# Patient Record
Sex: FEMALE | Race: WHITE | NOT HISPANIC OR LATINO | Employment: STUDENT | URBAN - METROPOLITAN AREA
[De-identification: names, ages, dates, MRNs, and addresses within clinical notes are randomized per-mention and may not be internally consistent; named-entity substitution may affect disease eponyms.]

---

## 2024-02-22 ENCOUNTER — HOSPITAL ENCOUNTER (EMERGENCY)
Facility: HOSPITAL | Age: 24
Discharge: HOME | End: 2024-02-22
Attending: EMERGENCY MEDICINE

## 2024-02-22 VITALS
HEART RATE: 98 BPM | BODY MASS INDEX: 27.31 KG/M2 | WEIGHT: 160 LBS | TEMPERATURE: 98 F | RESPIRATION RATE: 18 BRPM | SYSTOLIC BLOOD PRESSURE: 142 MMHG | DIASTOLIC BLOOD PRESSURE: 83 MMHG | HEIGHT: 64 IN | OXYGEN SATURATION: 100 %

## 2024-02-22 DIAGNOSIS — M79.7 FIBROMYALGIA: Primary | ICD-10-CM

## 2024-02-22 DIAGNOSIS — R20.2 PARESTHESIAS: ICD-10-CM

## 2024-02-22 LAB
APPEARANCE UR: CLEAR
B-HCG SERPL QL: NEGATIVE
BACTERIA #/AREA URNS AUTO: ABNORMAL /HPF
BILIRUB UR QL STRIP.AUTO: NEGATIVE
COLOR UR AUTO: ABNORMAL
GLUCOSE UR QL STRIP.AUTO: NORMAL
KETONES UR QL STRIP.AUTO: NEGATIVE
LEUKOCYTE ESTERASE UR QL STRIP.AUTO: 75
MUCOUS THREADS URNS QL MICRO: ABNORMAL /LPF
NITRITE UR QL STRIP.AUTO: NEGATIVE
PH UR STRIP.AUTO: 7.5 [PH]
PROT UR QL STRIP.AUTO: NEGATIVE
RBC #/AREA URNS AUTO: ABNORMAL /HPF
RBC UR QL AUTO: ABNORMAL
SP GR UR STRIP.AUTO: 1.02 (ref 1–1.03)
SQUAMOUS #/AREA URNS LPF: ABNORMAL /HPF
UROBILINOGEN UR STRIP-ACNC: NORMAL
WBC #/AREA URNS AUTO: ABNORMAL /HPF

## 2024-02-22 PROCEDURE — 99284 EMERGENCY DEPT VISIT MOD MDM: CPT | Mod: 25

## 2024-02-22 PROCEDURE — 81001 URINALYSIS AUTO W/SCOPE: CPT | Performed by: PHYSICIAN ASSISTANT

## 2024-02-22 PROCEDURE — 81025 URINE PREGNANCY TEST: CPT | Performed by: PHYSICIAN ASSISTANT

## 2024-02-22 RX ORDER — GABAPENTIN 100 MG/1
100 CAPSULE ORAL 2 TIMES DAILY
Qty: 60 CAPSULE | Refills: 0 | Status: SHIPPED | OUTPATIENT
Start: 2024-02-22 | End: 2024-03-23

## 2024-02-22 NOTE — FIRST PROVIDER EVALUATION
"Medical screening examination initiated.  I have conducted a focused provider triage encounter, findings are as follows:    Chief Complaint   Patient presents with    Tingling     Pt reports "pins and needles" in hands and feet all day and numbness in arms when waking up from sleeping x 2 weeks. Hx anxiety.      Brief history of present illness:  23 y.o. female with a history of fibromyalgia presents to the ED with "pins and needle sensation" in hands and feet for the last 2 weeks. States she woke up this morning with BUE numbness which did not go away for about 2 hours. Denies chest pain, SOB, abdominal pain, n/v, fever, chills     Vitals:    02/22/24 1006   BP: (!) 142/83   Pulse: 98   Resp: 18   Temp: 98.3 °F (36.8 °C)   SpO2: 100%   Weight: 72.6 kg (160 lb)   Height: 5' 4" (1.626 m)       Pertinent physical exam:  Awake, alert, ambulatory, non-labored respirations    Brief workup plan:  UA    Preliminary workup initiated; this workup will be continued and followed by the physician or advanced practice provider that is assigned to the patient when roomed.  "

## 2024-02-22 NOTE — Clinical Note
"Candy Llanes" Alan was seen and treated in our emergency department on 2/22/2024.  She may return to work on 02/26/2024.       If you have any questions or concerns, please don't hesitate to call.       RN    "

## 2024-02-22 NOTE — ED PROVIDER NOTES
"Encounter Date: 2/22/2024       History     Chief Complaint   Patient presents with    Tingling     Pt reports "pins and needles" in hands and feet all day and numbness in arms when waking up from sleeping x 2 weeks. Hx anxiety. Denies taking meds.     23 y.o. female with a history of fibromyalgia presents to the ED with "pins and needle sensation" in hands and feet for the last 2 weeks. States she woke up this morning with BUE numbness which did not go away for about 1 hour. Denies chest pain, SOB, abdominal pain, n/v, fever, chills. No known injury or trauma. History of anxiety however states she is not on any medication     The history is provided by the patient. No  was used.     Review of patient's allergies indicates:   Allergen Reactions    Iodine     Pcn [penicillins]     Sulfa (sulfonamide antibiotics)      Past Medical History:   Diagnosis Date    Anxiety disorder, unspecified      History reviewed. No pertinent surgical history.  History reviewed. No pertinent family history.  Social History     Tobacco Use    Smoking status: Never    Smokeless tobacco: Never     Review of Systems   Constitutional:  Negative for chills and fever.   Eyes:  Negative for visual disturbance.   Respiratory:  Negative for cough and shortness of breath.    Cardiovascular:  Negative for chest pain.   Gastrointestinal:  Negative for abdominal pain, nausea and vomiting.   Genitourinary:  Negative for dysuria.   Musculoskeletal:  Negative for arthralgias.   Skin:  Negative for color change and rash.   Neurological:  Positive for numbness. Negative for dizziness, weakness and headaches.   Psychiatric/Behavioral:  Negative for behavioral problems.    All other systems reviewed and are negative.      Physical Exam     Initial Vitals [02/22/24 1006]   BP Pulse Resp Temp SpO2   (!) 142/83 98 18 98.3 °F (36.8 °C) 100 %      MAP       --         Physical Exam    Nursing note and vitals reviewed.  Constitutional: She " appears well-developed and well-nourished.   HENT:   Head: Normocephalic and atraumatic.   Eyes: EOM are normal. Pupils are equal, round, and reactive to light.   Neck: Trachea normal and phonation normal. Neck supple.    Full passive range of motion without pain.     Cardiovascular:  Normal rate, regular rhythm and normal heart sounds.           Pulmonary/Chest: Breath sounds normal.   Musculoskeletal:         General: Normal range of motion.      Cervical back: Full passive range of motion without pain and neck supple. No spinous process tenderness or muscular tenderness.     Neurological: She is alert and oriented to person, place, and time. She has normal strength. No sensory deficit. She displays a negative Romberg sign. Coordination and gait normal. GCS score is 15. GCS eye subscore is 4. GCS verbal subscore is 5. GCS motor subscore is 6.   5/5 strength to BUE and BLE, distal sensation in tact with good cap refill    Skin: Skin is warm and dry.   Psychiatric: She has a normal mood and affect.         ED Course   Procedures  Labs Reviewed   URINALYSIS, REFLEX TO URINE CULTURE - Abnormal; Notable for the following components:       Result Value    Blood, UA 2+ (*)     Leukocyte Esterase, UA 75 (*)     WBC, UA 6-10 (*)     Mucous, UA Trace (*)     RBC, UA 21-50 (*)     All other components within normal limits   PREGNANCY TEST, URINE RAPID - Normal          Imaging Results              CT Head Without Contrast (Final result)  Result time 02/22/24 11:19:41      Final result by Genna Howard MD (02/22/24 11:19:41)                   Impression:      No acute intracranial abnormality.      Electronically signed by: Genna Howard  Date:    02/22/2024  Time:    11:19               Narrative:    EXAMINATION:  CT HEAD WITHOUT CONTRAST    CLINICAL HISTORY:  Neuro deficit, persistent/recurrent, CNS neoplasm suspected;    TECHNIQUE:  Axial scans were obtained from skull base to the vertex.    Coronal and sagittal  "reconstructions obtained from the axial data.    Automatic exposure control was utilized to limit radiation dose.    Contrast: None    Radiation Dose:    Total DLP: 881 mGy*cm    COMPARISON:  None    FINDINGS:  There is no acute intracranial hemorrhage or edema. The gray-white matter differentiation is preserved.    There is no mass effect or midline shift. The ventricles and sulci are normal in size. The basal cisterns are patent. There is no abnormal extra-axial fluid collection.    The calvarium and skull base are intact. The visualized paranasal sinuses and the mastoid air cells are clear.                                       X-Ray Cervical Spine AP And Lateral (Final result)  Result time 02/22/24 10:52:00      Final result by Roger Geller MD (02/22/24 10:52:00)                   Impression:      No acute radiographic findings.      Electronically signed by: Roger Geller  Date:    02/22/2024  Time:    10:52               Narrative:    EXAMINATION:  XR CERVICAL SPINE AP LATERAL    CLINICAL HISTORY:  radiculopathy;    COMPARISON:  No priors    FINDINGS:  Frontal, lateral and open mouth views of the cervical spine. There is no subluxation. The odontoid appears grossly intact and the C1-C2 relationship normal on the open mouth view. There is no prevertebral soft tissue swelling.                                       Medications - No data to display  Medical Decision Making  Differential diagnosis: radiculopathy, anxiety, panic attacks, neuropathy, fibromyalgia, TIA    23 y.o. female with a history of fibromyalgia presents to the ED with "pins and needle sensation" in hands and feet for the last 2 weeks. States she woke up this morning with BUE numbness which did not go away for about 1 hour. Denies chest pain, SOB, abdominal pain, n/v, fever, chills. No known injury or trauma. History of anxiety however states she is not on any medication       Amount and/or Complexity of Data Reviewed  Radiology: " ordered.               ED Course as of 02/22/24 1215   Thu Feb 22, 2024   1152 Discussed benign CT and c-spine XR with patient along with UA results. Upon further discussion, patient used to be on gabapentin a few years back for similar symptoms  however lost her insurance and stopped taking hte medication. States she also recently moved here and does not have PCP. Will restarted gabapentin and d/c home with PCP referral to establish care [MA]   1159 Leukocyte Esterase, UA(!): 75 [MA]   1159 WBC, UA(!): 6-10 [MA]   1159 Bacteria, UA: None Seen  No urinary symptoms at this time; will wait urine culture before treating.  [MA]      ED Course User Index  [MA] Lai Minaya PA-C                             Clinical Impression:  Final diagnoses:  [M79.7] Fibromyalgia (Primary)  [R20.2] Paresthesias          ED Disposition Condition    Discharge Stable          ED Prescriptions       Medication Sig Dispense Start Date End Date Auth. Provider    gabapentin (NEURONTIN) 100 MG capsule Take 1 capsule (100 mg total) by mouth 2 (two) times daily. 60 capsule 2/22/2024 3/23/2024 Lai Minaya PA-C          Follow-up Information       Follow up With Specialties Details Why Contact Info    [facility] - Emergency Dept Emergency Medicine In 1 week If symptoms worsen [address] [place] [id number] [phone]    Primary Care Provider  Call  Call the number above and they will get you scheduled with a primary care provider within 48 hours for an appointment [phone]    [facility]    Referral has been sent on your behalf; they will call to schedule follow-up appointment in order to establish care             Lai Minaya PA-C  02/22/24 1217

## 2024-10-07 ENCOUNTER — HOSPITAL ENCOUNTER (EMERGENCY)
Facility: HOSPITAL | Age: 24
Discharge: HOME | End: 2024-10-07
Attending: EMERGENCY MEDICINE

## 2024-10-07 VITALS
DIASTOLIC BLOOD PRESSURE: 85 MMHG | RESPIRATION RATE: 19 BRPM | HEIGHT: 63 IN | WEIGHT: 165 LBS | OXYGEN SATURATION: 99 % | HEART RATE: 94 BPM | SYSTOLIC BLOOD PRESSURE: 135 MMHG | BODY MASS INDEX: 29.23 KG/M2 | TEMPERATURE: 99 F

## 2024-10-07 DIAGNOSIS — B37.2 INTERTRIGINOUS CANDIDIASIS: Primary | ICD-10-CM

## 2024-10-07 PROCEDURE — 99282 EMERGENCY DEPT VISIT SF MDM: CPT

## 2024-10-07 RX ORDER — MICONAZOLE NITRATE 2 G/100G
POWDER TOPICAL DAILY
Qty: 85 G | Refills: 1 | Status: SHIPPED | OUTPATIENT
Start: 2024-10-07 | End: 2024-10-21

## 2024-10-07 NOTE — DISCHARGE INSTRUCTIONS
?Daily cleansing of intertriginous skin with a mild cleanser followed by drying of affected area with a hair dryer on a cool setting  ?Aeration of affected area when feasible  ?Daily application of drying powders, such as powders composed of microporous cellulose  ?Use of absorbent material or clothing, such as cotton or dias wool, to separate skin in folds

## 2024-10-07 NOTE — Clinical Note
"Candy Reynagaekah" Alan was seen and treated in our emergency department on 10/7/2024.  She may return to work on 10/08/2024.       If you have any questions or concerns, please don't hesitate to call.      Alfredito Ji RN    "

## 2024-10-07 NOTE — ED PROVIDER NOTES
Encounter Date: 10/7/2024    SCRIBE #1 NOTE: I, Tiny James, am scribing for, and in the presence of,  Karen Prasad DO. I have scribed the following portions of the note - Other sections scribed: HPI, ROS, PE.       History     Chief Complaint   Patient presents with    Axillary pain     Presents POV with c/o bilateral burning and stinging pain to the axillary region that began today. Denies other symptoms.      Patient is a 23 y/o female with a PMHx of anxiety disorder and fibromyalgia presents to the ED for bilateral burning/stinging and discoloration of the axillary region. Patient reports noticing the discoloration recently. She reports feeling a burning sensation in the area this morning. She denies using any new deodorant, soap, razors or other products.     The history is provided by the patient and medical records. No  was used.     Review of patient's allergies indicates:   Allergen Reactions    Amoxicillin     Iodine     Pcn [penicillins]     Sulfa (sulfonamide antibiotics)      Past Medical History:   Diagnosis Date    Anxiety disorder, unspecified      No past surgical history on file.  No family history on file.  Social History     Tobacco Use    Smoking status: Never    Smokeless tobacco: Never     Review of Systems   Musculoskeletal:         Positive for bilateral armpit discoloration and pain.       Physical Exam     Initial Vitals [10/07/24 0723]   BP Pulse Resp Temp SpO2   124/66 107 19 99.1 °F (37.3 °C) 100 %      MAP       --         Physical Exam    Musculoskeletal:        Arms:       Comments: Oval shaped hyperpigmentation and irritation to bilateral axilla.  Mild warmth to the area.           ED Course   Procedures  Labs Reviewed - No data to display       Imaging Results    None          Medications - No data to display  Medical Decision Making  The differential diagnosis includes, but is not limited to, contact dermatitis, fungal infection, and folliculitis.    Problems  Addressed:  Intertriginous candidiasis: acute illness or injury that poses a threat to life or bodily functions    Risk  OTC drugs.  Prescription drug management.            Scribe Attestation:   Scribe #1: I performed the above scribed service and the documentation accurately describes the services I performed. I attest to the accuracy of the note.    Attending Attestation:           Physician Attestation for Scribe:  Physician Attestation Statement for Scribe #1: I, Karen Prasad, DO, reviewed documentation, as scribed by Tiny James in my presence, and it is both accurate and complete.                                    Clinical Impression:  Final diagnoses:  [B37.2] Intertriginous candidiasis (Primary)          ED Disposition Condition    Discharge           ED Prescriptions       Medication Sig Dispense Start Date End Date Auth. Provider    miconazole NITRATE 2 % (MICOTIN) 2 % top powder Apply topically once daily. for 14 days 85 g 10/7/2024 10/21/2024 Karen Prasad MD          Follow-up Information       Follow up With Specialties Details Why Contact Info    [facility] - Emergency Dept Emergency Medicine  As needed, If symptoms worsen [address] [id number]  [phone]             Karen Prasad MD  10/07/24 1138